# Patient Record
Sex: MALE | Race: WHITE | NOT HISPANIC OR LATINO | Employment: FULL TIME | URBAN - METROPOLITAN AREA
[De-identification: names, ages, dates, MRNs, and addresses within clinical notes are randomized per-mention and may not be internally consistent; named-entity substitution may affect disease eponyms.]

---

## 2021-06-29 ENCOUNTER — CLINICAL SUPPORT (OUTPATIENT)
Dept: FAMILY MEDICINE CLINIC | Facility: CLINIC | Age: 41
End: 2021-06-29

## 2021-06-29 DIAGNOSIS — J30.9 ALLERGIC RHINITIS, UNSPECIFIED SEASONALITY, UNSPECIFIED TRIGGER: Primary | ICD-10-CM

## 2021-06-29 PROCEDURE — 95117 IMMUNOTHERAPY INJECTIONS: CPT | Performed by: NURSE PRACTITIONER

## 2021-07-06 ENCOUNTER — CLINICAL SUPPORT (OUTPATIENT)
Dept: FAMILY MEDICINE CLINIC | Facility: CLINIC | Age: 41
End: 2021-07-06

## 2021-07-06 DIAGNOSIS — J30.9 ALLERGIC RHINITIS, UNSPECIFIED SEASONALITY, UNSPECIFIED TRIGGER: Primary | ICD-10-CM

## 2021-07-06 PROCEDURE — 95117 IMMUNOTHERAPY INJECTIONS: CPT | Performed by: NURSE PRACTITIONER

## 2021-07-13 ENCOUNTER — CLINICAL SUPPORT (OUTPATIENT)
Dept: FAMILY MEDICINE CLINIC | Facility: CLINIC | Age: 41
End: 2021-07-13

## 2021-07-13 DIAGNOSIS — J30.9 ALLERGIC RHINITIS, UNSPECIFIED SEASONALITY, UNSPECIFIED TRIGGER: Primary | ICD-10-CM

## 2021-07-13 PROCEDURE — 95117 IMMUNOTHERAPY INJECTIONS: CPT | Performed by: FAMILY MEDICINE

## 2021-07-16 ENCOUNTER — CLINICAL SUPPORT (OUTPATIENT)
Dept: FAMILY MEDICINE CLINIC | Facility: CLINIC | Age: 41
End: 2021-07-16

## 2021-07-16 DIAGNOSIS — J30.9 ALLERGIC RHINITIS, UNSPECIFIED SEASONALITY, UNSPECIFIED TRIGGER: Primary | ICD-10-CM

## 2021-07-16 PROCEDURE — 95117 IMMUNOTHERAPY INJECTIONS: CPT | Performed by: FAMILY MEDICINE

## 2021-07-21 ENCOUNTER — CLINICAL SUPPORT (OUTPATIENT)
Dept: FAMILY MEDICINE CLINIC | Facility: CLINIC | Age: 41
End: 2021-07-21

## 2021-07-21 DIAGNOSIS — J30.9 ALLERGIC RHINITIS, UNSPECIFIED SEASONALITY, UNSPECIFIED TRIGGER: Primary | ICD-10-CM

## 2021-07-21 PROCEDURE — 95117 IMMUNOTHERAPY INJECTIONS: CPT | Performed by: FAMILY MEDICINE

## 2021-07-30 ENCOUNTER — CLINICAL SUPPORT (OUTPATIENT)
Dept: FAMILY MEDICINE CLINIC | Facility: CLINIC | Age: 41
End: 2021-07-30

## 2021-07-30 DIAGNOSIS — J30.9 ALLERGIC RHINITIS, UNSPECIFIED SEASONALITY, UNSPECIFIED TRIGGER: Primary | ICD-10-CM

## 2021-07-30 PROCEDURE — 95117 IMMUNOTHERAPY INJECTIONS: CPT | Performed by: FAMILY MEDICINE

## 2021-08-03 ENCOUNTER — CLINICAL SUPPORT (OUTPATIENT)
Dept: FAMILY MEDICINE CLINIC | Facility: CLINIC | Age: 41
End: 2021-08-03

## 2021-08-03 DIAGNOSIS — J30.9 ALLERGIC RHINITIS, UNSPECIFIED SEASONALITY, UNSPECIFIED TRIGGER: Primary | ICD-10-CM

## 2021-08-03 PROCEDURE — 95117 IMMUNOTHERAPY INJECTIONS: CPT | Performed by: NURSE PRACTITIONER

## 2021-08-18 ENCOUNTER — CLINICAL SUPPORT (OUTPATIENT)
Dept: FAMILY MEDICINE CLINIC | Facility: CLINIC | Age: 41
End: 2021-08-18

## 2021-08-18 DIAGNOSIS — J30.9 ALLERGIC RHINITIS, UNSPECIFIED SEASONALITY, UNSPECIFIED TRIGGER: Primary | ICD-10-CM

## 2021-08-18 PROCEDURE — 95117 IMMUNOTHERAPY INJECTIONS: CPT | Performed by: FAMILY MEDICINE

## 2021-09-01 ENCOUNTER — CLINICAL SUPPORT (OUTPATIENT)
Dept: FAMILY MEDICINE CLINIC | Facility: CLINIC | Age: 41
End: 2021-09-01

## 2021-09-01 DIAGNOSIS — J31.0 RHINITIS, UNSPECIFIED TYPE: Primary | ICD-10-CM

## 2021-09-01 PROCEDURE — 95117 IMMUNOTHERAPY INJECTIONS: CPT | Performed by: NURSE PRACTITIONER

## 2022-11-22 ENCOUNTER — OFFICE VISIT (OUTPATIENT)
Dept: FAMILY MEDICINE CLINIC | Facility: CLINIC | Age: 42
End: 2022-11-22

## 2022-11-22 VITALS — TEMPERATURE: 99.5 F | WEIGHT: 194.4 LBS | HEART RATE: 87 BPM | OXYGEN SATURATION: 98 %

## 2022-11-22 DIAGNOSIS — J20.9 ACUTE BRONCHITIS, UNSPECIFIED ORGANISM: ICD-10-CM

## 2022-11-22 DIAGNOSIS — J01.90 ACUTE SINUSITIS, RECURRENCE NOT SPECIFIED, UNSPECIFIED LOCATION: Primary | ICD-10-CM

## 2022-11-22 PROBLEM — Q23.81 BICUSPID AORTIC VALVE: Status: ACTIVE | Noted: 2022-11-22

## 2022-11-22 PROBLEM — K58.9 IBS (IRRITABLE BOWEL SYNDROME): Status: ACTIVE | Noted: 2022-11-22

## 2022-11-22 PROBLEM — Q23.1 BICUSPID AORTIC VALVE: Status: ACTIVE | Noted: 2022-11-22

## 2022-11-22 PROBLEM — T15.00XA FOREIGN BODY OF CORNEA: Status: ACTIVE | Noted: 2022-11-22

## 2022-11-22 PROBLEM — Q63.1 HORSESHOE KIDNEY: Status: ACTIVE | Noted: 2022-11-22

## 2022-11-22 LAB
EXPIRATION DATE: NORMAL
FLUAV AG NPH QL: NEGATIVE
FLUBV AG NPH QL: NEGATIVE
INTERNAL CONTROL: NORMAL
Lab: NORMAL
SARS-COV-2 RNA PNL SPEC NAA+PROBE: NOT DETECTED

## 2022-11-22 PROCEDURE — 99213 OFFICE O/P EST LOW 20 MIN: CPT | Performed by: FAMILY MEDICINE

## 2022-11-22 PROCEDURE — 87804 INFLUENZA ASSAY W/OPTIC: CPT | Performed by: FAMILY MEDICINE

## 2022-11-22 PROCEDURE — U0004 COV-19 TEST NON-CDC HGH THRU: HCPCS | Performed by: FAMILY MEDICINE

## 2022-11-22 RX ORDER — BENZONATATE 200 MG/1
200 CAPSULE ORAL 3 TIMES DAILY PRN
Qty: 30 CAPSULE | Refills: 0 | Status: SHIPPED | OUTPATIENT
Start: 2022-11-22 | End: 2022-12-06

## 2022-11-22 RX ORDER — FLUTICASONE PROPIONATE 50 MCG
2 SPRAY, SUSPENSION (ML) NASAL DAILY
Qty: 11.1 ML | Refills: 2 | Status: SHIPPED | OUTPATIENT
Start: 2022-11-22 | End: 2022-12-06 | Stop reason: SDUPTHER

## 2022-11-22 RX ORDER — AMOXICILLIN AND CLAVULANATE POTASSIUM 500; 125 MG/1; MG/1
1 TABLET, FILM COATED ORAL 2 TIMES DAILY
Qty: 10 TABLET | Refills: 0 | Status: SHIPPED | OUTPATIENT
Start: 2022-11-22 | End: 2022-11-27

## 2022-11-22 RX ORDER — PREDNISONE 10 MG/1
30 TABLET ORAL DAILY
Qty: 15 TABLET | Refills: 0 | Status: SHIPPED | OUTPATIENT
Start: 2022-11-22 | End: 2022-11-27

## 2022-11-22 NOTE — PROGRESS NOTES
Chief Complaint    Nasal Congestion (Cough, discolored nasal drainage/congestion, sinus pressure, body aches.)    Subjective      Berry Villarreal presents to [facility]    History of Present Illness    1.) ACUTE SINUSITIS : Onset-greater than 24 hours.  The patient presents with c/o nasal/sinus congestion.  He is also c/o a cough and body aches.  No c/o fevers or chills.  No c/o loss of taste or smell.    Objective     Vital Signs:     Pulse 87   Temp 99.5 °F (37.5 °C) (Temporal)   Wt 88.2 kg (194 lb 6.4 oz)   SpO2 98%       Physical Exam  Vitals reviewed.   Constitutional:       General: He is not in acute distress.     Appearance: Normal appearance. He is well-developed.   HENT:      Head: Normocephalic and atraumatic.      Right Ear: Hearing and external ear normal. Tympanic membrane is not erythematous or bulging.      Left Ear: Hearing and external ear normal. Tympanic membrane is not erythematous or bulging.      Nose: Congestion present.      Mouth/Throat:      Pharynx: Posterior oropharyngeal erythema present. No oropharyngeal exudate.   Eyes:      General: Lids are normal.         Right eye: No discharge.         Left eye: No discharge.      Conjunctiva/sclera: Conjunctivae normal.   Pulmonary:      Effort: Pulmonary effort is normal.      Breath sounds: Normal breath sounds.   Abdominal:      General: There is no distension.   Musculoskeletal:         General: No swelling.      Cervical back: Neck supple.   Skin:     Coloration: Skin is not jaundiced.      Findings: No erythema.   Neurological:      Mental Status: He is alert. Mental status is at baseline.   Psychiatric:         Mood and Affect: Mood and affect normal.         Thought Content: Thought content normal.     Assessment and Plan     Diagnoses and all orders for this visit:    1. Acute sinusitis, recurrence not specified, unspecified location (Primary)  Comments:  Start antibiotic as directed secondary to  severity of symptoms.  Start p.o. steroid course and see if any sinus pain or worsening cough during holiday weekend  Orders:  -     POCT Influenza A/B  -     COVID-19,APTIMA PANTHER(SIOBHAN), ROSALIA/ ELVA, NP/OP SWAB IN UTM/VTM/[facility],24 HR TAT - Swab, Nasopharynx    2. Acute bronchitis, unspecified organism  Comments:  Antitussives as needed.  Rapid testing negative here in office.  Adequate fluids and rest.    Other orders  -     benzonatate (TESSALON) 200 MG capsule; Take 1 capsule by mouth 3 (Three) Times a Day As Needed for Cough.  Dispense: 30 capsule; Refill: 0  -     amoxicillin-clavulanate (Augmentin) 500-125 MG per tablet; Take 1 tablet by mouth 2 (Two) Times a Day for 5 days.  Dispense: 10 tablet; Refill: 0  -     predniSONE (DELTASONE) 10 MG tablet; Take 3 tablets by mouth Daily for 5 days.  Dispense: 15 tablet; Refill: 0  -     fluticasone (Flonase) 50 MCG/ACT nasal spray; 2 sprays into the nostril(s) as directed by provider Daily.  Dispense: 11.1 mL; Refill: 2    Follow Up : As needed.    Patient was given instructions and counseling regarding his condition or for health maintenance advice. Please see specific information pulled into the AVS if appropriate.

## 2022-12-06 ENCOUNTER — OFFICE VISIT (OUTPATIENT)
Dept: FAMILY MEDICINE CLINIC | Facility: CLINIC | Age: 42
End: 2022-12-06

## 2022-12-06 VITALS — OXYGEN SATURATION: 97 % | HEART RATE: 90 BPM | TEMPERATURE: 97.5 F | WEIGHT: 190.4 LBS

## 2022-12-06 DIAGNOSIS — H65.01 RIGHT ACUTE SEROUS OTITIS MEDIA, RECURRENCE NOT SPECIFIED: ICD-10-CM

## 2022-12-06 DIAGNOSIS — J06.9 UPPER RESPIRATORY TRACT INFECTION, UNSPECIFIED TYPE: Primary | ICD-10-CM

## 2022-12-06 PROBLEM — T15.10XA FOREIGN BODY IN CONJUNCTIVAL SAC: Status: ACTIVE | Noted: 2021-08-20

## 2022-12-06 LAB
EXPIRATION DATE: NORMAL
FLUAV AG UPPER RESP QL IA.RAPID: NOT DETECTED
FLUBV AG UPPER RESP QL IA.RAPID: NOT DETECTED
INTERNAL CONTROL: NORMAL
Lab: NORMAL
SARS-COV-2 AG UPPER RESP QL IA.RAPID: NOT DETECTED

## 2022-12-06 PROCEDURE — 99213 OFFICE O/P EST LOW 20 MIN: CPT | Performed by: FAMILY MEDICINE

## 2022-12-06 PROCEDURE — 87428 SARSCOV & INF VIR A&B AG IA: CPT | Performed by: FAMILY MEDICINE

## 2022-12-06 RX ORDER — CEFDINIR 300 MG/1
300 CAPSULE ORAL 2 TIMES DAILY
Qty: 14 CAPSULE | Refills: 0 | Status: SHIPPED | OUTPATIENT
Start: 2022-12-06 | End: 2022-12-13

## 2022-12-06 RX ORDER — PREDNISONE 10 MG/1
30 TABLET ORAL DAILY
Qty: 15 TABLET | Refills: 0 | Status: SHIPPED | OUTPATIENT
Start: 2022-12-06 | End: 2022-12-11

## 2022-12-06 RX ORDER — FLUTICASONE PROPIONATE 50 MCG
2 SPRAY, SUSPENSION (ML) NASAL DAILY
Qty: 11.1 ML | Refills: 2 | Status: SHIPPED | OUTPATIENT
Start: 2022-12-06

## 2022-12-06 NOTE — PROGRESS NOTES
Chief Complaint    Earache (RT ear pain.  Feels like full of fluid.  H/O busted eardrum.  Taking Ibuprofen) and Fever (Fever, nasal congestion continues.)    Subjective      Berry Villarreal presents to [facility]    History of Present Illness    1.) URI SXS : Onset-greater than 1 week.  Patient was recently evaluated here in office for URI symptoms.  He was treated for an acute sinus infection.  He presents today with nasal congestion.  He has experienced fevers at home.  He is also complaining of right ear pain.    Objective     Vital Signs:     Pulse 90   Temp 97.5 °F (36.4 °C) (Temporal)   Wt 86.4 kg (190 lb 6.4 oz)   SpO2 97%       Physical Exam  Vitals reviewed.   Constitutional:       General: He is not in acute distress.     Appearance: Normal appearance. He is well-developed.   HENT:      Head: Normocephalic and atraumatic.      Right Ear: Hearing and external ear normal. A middle ear effusion is present. Tympanic membrane is erythematous.      Left Ear: Hearing and external ear normal. A middle ear effusion is present. Tympanic membrane is not erythematous.      Nose: Congestion present.   Eyes:      General: Lids are normal.         Right eye: No discharge.         Left eye: No discharge.      Conjunctiva/sclera: Conjunctivae normal.   Pulmonary:      Effort: Pulmonary effort is normal.      Breath sounds: Normal breath sounds.   Abdominal:      General: There is no distension.   Musculoskeletal:         General: No swelling.      Cervical back: Neck supple.   Skin:     Coloration: Skin is not jaundiced.      Findings: No erythema.   Neurological:      Mental Status: He is alert. Mental status is at baseline.   Psychiatric:         Mood and Affect: Mood and affect normal.         Thought Content: Thought content normal.       Assessment and Plan     Diagnoses and all orders for this visit:    1. Upper respiratory tract infection, unspecified type  (Primary)  Comments:  Adequate fluids and rest.   Orders:  -     POCT SARS-CoV-2 Antigen BEE + Flu    2. Right acute serous otitis media, recurrence not specified  Comments:  Negative rapid testing. Medications as noted.     Other orders  -     cefdinir (OMNICEF) 300 MG capsule; Take 1 capsule by mouth 2 (Two) Times a Day for 7 days.  Dispense: 14 capsule; Refill: 0  -     fluticasone (Flonase) 50 MCG/ACT nasal spray; 2 sprays into the nostril(s) as directed by provider Daily.  Dispense: 11.1 mL; Refill: 2  -     predniSONE (DELTASONE) 10 MG tablet; Take 3 tablets by mouth Daily for 5 days.  Dispense: 15 tablet; Refill: 0      Follow Up : As needed.     Patient was given instructions and counseling regarding his condition or for health maintenance advice. Please see specific information pulled into the AVS if appropriate.

## 2023-01-08 ENCOUNTER — HOSPITAL ENCOUNTER (EMERGENCY)
Facility: HOSPITAL | Age: 43
Discharge: HOME | End: 2023-01-08
Attending: EMERGENCY MEDICINE | Admitting: SURGERY
Payer: COMMERCIAL

## 2023-01-08 ENCOUNTER — APPOINTMENT (OUTPATIENT)
Dept: GENERAL RADIOLOGY | Facility: HOSPITAL | Age: 43
End: 2023-01-08
Payer: COMMERCIAL

## 2023-01-08 ENCOUNTER — APPOINTMENT (OUTPATIENT)
Dept: CT IMAGING | Facility: HOSPITAL | Age: 43
End: 2023-01-08
Payer: COMMERCIAL

## 2023-01-08 ENCOUNTER — ANESTHESIA (OUTPATIENT)
Dept: PERIOP | Facility: HOSPITAL | Age: 43
End: 2023-01-08
Payer: COMMERCIAL

## 2023-01-08 ENCOUNTER — ANESTHESIA EVENT (OUTPATIENT)
Dept: PERIOP | Facility: HOSPITAL | Age: 43
End: 2023-01-08
Payer: COMMERCIAL

## 2023-01-08 VITALS
TEMPERATURE: 98.5 F | WEIGHT: 190 LBS | BODY MASS INDEX: 31.65 KG/M2 | SYSTOLIC BLOOD PRESSURE: 168 MMHG | HEIGHT: 65 IN | RESPIRATION RATE: 23 BRPM | DIASTOLIC BLOOD PRESSURE: 84 MMHG | HEART RATE: 71 BPM | OXYGEN SATURATION: 97 %

## 2023-01-08 DIAGNOSIS — K35.80 ACUTE APPENDICITIS: ICD-10-CM

## 2023-01-08 DIAGNOSIS — K35.30 ACUTE APPENDICITIS WITH LOCALIZED PERITONITIS, WITHOUT PERFORATION, ABSCESS, OR GANGRENE: ICD-10-CM

## 2023-01-08 DIAGNOSIS — R10.31 ACUTE ABDOMINAL PAIN IN RIGHT LOWER QUADRANT: Primary | ICD-10-CM

## 2023-01-08 LAB
ALBUMIN SERPL-MCNC: 4.8 G/DL (ref 3.5–5.2)
ALBUMIN/GLOB SERPL: 2.1 G/DL
ALP SERPL-CCNC: 71 U/L (ref 39–117)
ALT SERPL W P-5'-P-CCNC: 36 U/L (ref 1–41)
ANION GAP SERPL CALCULATED.3IONS-SCNC: 9.8 MMOL/L (ref 5–15)
AST SERPL-CCNC: 31 U/L (ref 1–40)
BASOPHILS # BLD AUTO: 0.02 10*3/MM3 (ref 0–0.2)
BASOPHILS NFR BLD AUTO: 0.1 % (ref 0–1.5)
BILIRUB SERPL-MCNC: 0.8 MG/DL (ref 0–1.2)
BILIRUB UR QL STRIP: NEGATIVE
BUN SERPL-MCNC: 12 MG/DL (ref 6–20)
BUN/CREAT SERPL: 16.7 (ref 7–25)
CALCIUM SPEC-SCNC: 9.5 MG/DL (ref 8.6–10.5)
CHLORIDE SERPL-SCNC: 97 MMOL/L (ref 98–107)
CLARITY UR: CLEAR
CO2 SERPL-SCNC: 28.2 MMOL/L (ref 22–29)
COLOR UR: YELLOW
CREAT SERPL-MCNC: 0.72 MG/DL (ref 0.76–1.27)
DEPRECATED RDW RBC AUTO: 40.8 FL (ref 37–54)
EGFRCR SERPLBLD CKD-EPI 2021: 117 ML/MIN/1.73
EOSINOPHIL # BLD AUTO: 0.01 10*3/MM3 (ref 0–0.4)
EOSINOPHIL NFR BLD AUTO: 0.1 % (ref 0.3–6.2)
ERYTHROCYTE [DISTWIDTH] IN BLOOD BY AUTOMATED COUNT: 12.4 % (ref 12.3–15.4)
GLOBULIN UR ELPH-MCNC: 2.3 GM/DL
GLUCOSE SERPL-MCNC: 123 MG/DL (ref 65–99)
GLUCOSE UR STRIP-MCNC: NEGATIVE MG/DL
HCT VFR BLD AUTO: 40.9 % (ref 37.5–51)
HGB BLD-MCNC: 14.2 G/DL (ref 13–17.7)
HGB UR QL STRIP.AUTO: NEGATIVE
IMM GRANULOCYTES # BLD AUTO: 0.07 10*3/MM3 (ref 0–0.05)
IMM GRANULOCYTES NFR BLD AUTO: 0.5 % (ref 0–0.5)
KETONES UR QL STRIP: ABNORMAL
LEUKOCYTE ESTERASE UR QL STRIP.AUTO: NEGATIVE
LIPASE SERPL-CCNC: 22 U/L (ref 13–60)
LYMPHOCYTES # BLD AUTO: 1.65 10*3/MM3 (ref 0.7–3.1)
LYMPHOCYTES NFR BLD AUTO: 11.5 % (ref 19.6–45.3)
MCH RBC QN AUTO: 30.9 PG (ref 26.6–33)
MCHC RBC AUTO-ENTMCNC: 34.7 G/DL (ref 31.5–35.7)
MCV RBC AUTO: 88.9 FL (ref 79–97)
MONOCYTES # BLD AUTO: 0.56 10*3/MM3 (ref 0.1–0.9)
MONOCYTES NFR BLD AUTO: 3.9 % (ref 5–12)
NEUTROPHILS NFR BLD AUTO: 12.03 10*3/MM3 (ref 1.7–7)
NEUTROPHILS NFR BLD AUTO: 83.9 % (ref 42.7–76)
NITRITE UR QL STRIP: NEGATIVE
NRBC BLD AUTO-RTO: 0 /100 WBC (ref 0–0.2)
PH UR STRIP.AUTO: 6.5 [PH] (ref 5–8)
PLATELET # BLD AUTO: 195 10*3/MM3 (ref 140–450)
PMV BLD AUTO: 10.5 FL (ref 6–12)
POTASSIUM SERPL-SCNC: 4.1 MMOL/L (ref 3.5–5.2)
PROT SERPL-MCNC: 7.1 G/DL (ref 6–8.5)
PROT UR QL STRIP: ABNORMAL
QT INTERVAL: 446 MS
RBC # BLD AUTO: 4.6 10*6/MM3 (ref 4.14–5.8)
SODIUM SERPL-SCNC: 135 MMOL/L (ref 136–145)
SP GR UR STRIP: 1.02 (ref 1–1.03)
TROPONIN T SERPL-MCNC: <0.01 NG/ML (ref 0–0.03)
UROBILINOGEN UR QL STRIP: ABNORMAL
WBC NRBC COR # BLD: 14.34 10*3/MM3 (ref 3.4–10.8)

## 2023-01-08 PROCEDURE — 99285 EMERGENCY DEPT VISIT HI MDM: CPT

## 2023-01-08 PROCEDURE — 88304 TISSUE EXAM BY PATHOLOGIST: CPT | Performed by: SURGERY

## 2023-01-08 PROCEDURE — 85025 COMPLETE CBC W/AUTO DIFF WBC: CPT | Performed by: PHYSICIAN ASSISTANT

## 2023-01-08 PROCEDURE — 25010000002 PIPERACILLIN SOD-TAZOBACTAM PER 1 G: Performed by: EMERGENCY MEDICINE

## 2023-01-08 PROCEDURE — 96365 THER/PROPH/DIAG IV INF INIT: CPT

## 2023-01-08 PROCEDURE — 96375 TX/PRO/DX INJ NEW DRUG ADDON: CPT

## 2023-01-08 PROCEDURE — 99284 EMERGENCY DEPT VISIT MOD MDM: CPT

## 2023-01-08 PROCEDURE — 25010000002 PROPOFOL 10 MG/ML EMULSION: Performed by: ANESTHESIOLOGY

## 2023-01-08 PROCEDURE — 44970 LAPAROSCOPY APPENDECTOMY: CPT | Performed by: SURGERY

## 2023-01-08 PROCEDURE — 71045 X-RAY EXAM CHEST 1 VIEW: CPT

## 2023-01-08 PROCEDURE — 81003 URINALYSIS AUTO W/O SCOPE: CPT | Performed by: PHYSICIAN ASSISTANT

## 2023-01-08 PROCEDURE — 25010000002 FENTANYL CITRATE (PF) 50 MCG/ML SOLUTION: Performed by: ANESTHESIOLOGY

## 2023-01-08 PROCEDURE — 74176 CT ABD & PELVIS W/O CONTRAST: CPT

## 2023-01-08 PROCEDURE — 25010000002 MORPHINE PER 10 MG: Performed by: EMERGENCY MEDICINE

## 2023-01-08 PROCEDURE — 93005 ELECTROCARDIOGRAM TRACING: CPT | Performed by: EMERGENCY MEDICINE

## 2023-01-08 PROCEDURE — 83690 ASSAY OF LIPASE: CPT | Performed by: PHYSICIAN ASSISTANT

## 2023-01-08 PROCEDURE — 99223 1ST HOSP IP/OBS HIGH 75: CPT | Performed by: SURGERY

## 2023-01-08 PROCEDURE — 25010000002 NEOSTIGMINE 5 MG/10ML SOLUTION: Performed by: ANESTHESIOLOGY

## 2023-01-08 PROCEDURE — 25010000002 ONDANSETRON PER 1 MG: Performed by: ANESTHESIOLOGY

## 2023-01-08 PROCEDURE — 93005 ELECTROCARDIOGRAM TRACING: CPT

## 2023-01-08 PROCEDURE — 80053 COMPREHEN METABOLIC PANEL: CPT | Performed by: PHYSICIAN ASSISTANT

## 2023-01-08 PROCEDURE — 84484 ASSAY OF TROPONIN QUANT: CPT | Performed by: PHYSICIAN ASSISTANT

## 2023-01-08 PROCEDURE — 25010000002 ONDANSETRON PER 1 MG: Performed by: EMERGENCY MEDICINE

## 2023-01-08 PROCEDURE — 93010 ELECTROCARDIOGRAM REPORT: CPT | Performed by: INTERNAL MEDICINE

## 2023-01-08 DEVICE — THE ECHELON, ECHELON ENDOPATH™ AND ECHELON FLEX™ FAMILIES OF ENDOSCOPIC LINEAR CUTTERS AND RELOADS ARE STERILE, SINGLE PATIENT USE INSTRUMENTS THAT SIMULTANEOUSLY CUT AND STAPLE TISSUE. THERE ARE SIX STAGGERED ROWS OF STAPLES, THREE ON EITHER SIDE OF THE CUT LINE. THE 45 MM INSTRUMENTS HAVE A STAPLE LINE THATIS APPROXIMATELY 45 MM LONG AND A CUT LINE THAT IS APPROXIMATELY 42 MM LONG. THE SHAFT CAN ROTATE FREELY IN BOTH DIRECTIONS AND AN ARTICULATION MECHANISM ON ARTICULATING INSTRUMENTS ENABLES BENDING THE DISTAL PORTIONOF THE SHAFT TO FACILITATE LATERAL ACCESS OF THE OPERATIVE SITE.THE INSTRUMENTS ARE SHIPPED WITHOUT A RELOAD AND MUST BE LOADED PRIOR TO USE. A STAPLE RETAINING CAP ON THE RELOAD PROTECTS THE STAPLE LEG POINTS DURING SHIPPING AND TRANSPORTATION. THE INSTRUMENTS’ LOCK-OUT FEATURE IS DESIGNED TO PREVENT A USED RELOAD FROM BEING REFIRED.
Type: IMPLANTABLE DEVICE | Site: ABDOMEN | Status: FUNCTIONAL
Brand: ECHELON ENDOPATH

## 2023-01-08 RX ORDER — OXYCODONE AND ACETAMINOPHEN 7.5; 325 MG/1; MG/1
1 TABLET ORAL EVERY 4 HOURS PRN
Status: DISCONTINUED | OUTPATIENT
Start: 2023-01-08 | End: 2023-01-08 | Stop reason: HOSPADM

## 2023-01-08 RX ORDER — ONDANSETRON 4 MG/1
4 TABLET, FILM COATED ORAL EVERY 8 HOURS PRN
Qty: 30 TABLET | Refills: 1 | Status: SHIPPED | OUTPATIENT
Start: 2023-01-08 | End: 2023-01-20

## 2023-01-08 RX ORDER — SODIUM CHLORIDE 0.9 % (FLUSH) 0.9 %
10 SYRINGE (ML) INJECTION EVERY 12 HOURS SCHEDULED
Status: DISCONTINUED | OUTPATIENT
Start: 2023-01-08 | End: 2023-01-08 | Stop reason: HOSPADM

## 2023-01-08 RX ORDER — ROCURONIUM BROMIDE 10 MG/ML
INJECTION, SOLUTION INTRAVENOUS AS NEEDED
Status: DISCONTINUED | OUTPATIENT
Start: 2023-01-08 | End: 2023-01-08 | Stop reason: SURG

## 2023-01-08 RX ORDER — PROPOFOL 10 MG/ML
VIAL (ML) INTRAVENOUS AS NEEDED
Status: DISCONTINUED | OUTPATIENT
Start: 2023-01-08 | End: 2023-01-08 | Stop reason: SURG

## 2023-01-08 RX ORDER — FLUMAZENIL 0.1 MG/ML
0.2 INJECTION INTRAVENOUS AS NEEDED
Status: DISCONTINUED | OUTPATIENT
Start: 2023-01-08 | End: 2023-01-08 | Stop reason: HOSPADM

## 2023-01-08 RX ORDER — NALOXONE HCL 0.4 MG/ML
0.2 VIAL (ML) INJECTION AS NEEDED
Status: DISCONTINUED | OUTPATIENT
Start: 2023-01-08 | End: 2023-01-08 | Stop reason: HOSPADM

## 2023-01-08 RX ORDER — ONDANSETRON 2 MG/ML
4 INJECTION INTRAMUSCULAR; INTRAVENOUS ONCE AS NEEDED
Status: DISCONTINUED | OUTPATIENT
Start: 2023-01-08 | End: 2023-01-08 | Stop reason: HOSPADM

## 2023-01-08 RX ORDER — OXYCODONE HYDROCHLORIDE AND ACETAMINOPHEN 5; 325 MG/1; MG/1
1 TABLET ORAL ONCE AS NEEDED
Status: DISCONTINUED | OUTPATIENT
Start: 2023-01-08 | End: 2023-01-08 | Stop reason: HOSPADM

## 2023-01-08 RX ORDER — PROMETHAZINE HYDROCHLORIDE 25 MG/1
25 SUPPOSITORY RECTAL ONCE AS NEEDED
Status: DISCONTINUED | OUTPATIENT
Start: 2023-01-08 | End: 2023-01-08 | Stop reason: HOSPADM

## 2023-01-08 RX ORDER — DIPHENHYDRAMINE HCL 25 MG
25 CAPSULE ORAL
Status: DISCONTINUED | OUTPATIENT
Start: 2023-01-08 | End: 2023-01-08 | Stop reason: HOSPADM

## 2023-01-08 RX ORDER — PROMETHAZINE HYDROCHLORIDE 25 MG/1
12.5 TABLET ORAL ONCE AS NEEDED
Status: DISCONTINUED | OUTPATIENT
Start: 2023-01-08 | End: 2023-01-08 | Stop reason: HOSPADM

## 2023-01-08 RX ORDER — LIDOCAINE HYDROCHLORIDE 20 MG/ML
INJECTION, SOLUTION INFILTRATION; PERINEURAL AS NEEDED
Status: DISCONTINUED | OUTPATIENT
Start: 2023-01-08 | End: 2023-01-08 | Stop reason: SURG

## 2023-01-08 RX ORDER — SODIUM CHLORIDE 9 MG/ML
40 INJECTION, SOLUTION INTRAVENOUS AS NEEDED
Status: DISCONTINUED | OUTPATIENT
Start: 2023-01-08 | End: 2023-01-08 | Stop reason: HOSPADM

## 2023-01-08 RX ORDER — FAMOTIDINE 10 MG/ML
20 INJECTION, SOLUTION INTRAVENOUS
Status: DISCONTINUED | OUTPATIENT
Start: 2023-01-08 | End: 2023-01-08 | Stop reason: HOSPADM

## 2023-01-08 RX ORDER — GLYCOPYRROLATE 0.2 MG/ML
INJECTION INTRAMUSCULAR; INTRAVENOUS AS NEEDED
Status: DISCONTINUED | OUTPATIENT
Start: 2023-01-08 | End: 2023-01-08 | Stop reason: SURG

## 2023-01-08 RX ORDER — DIPHENHYDRAMINE HYDROCHLORIDE 50 MG/ML
12.5 INJECTION INTRAMUSCULAR; INTRAVENOUS
Status: DISCONTINUED | OUTPATIENT
Start: 2023-01-08 | End: 2023-01-08 | Stop reason: HOSPADM

## 2023-01-08 RX ORDER — AMOXICILLIN AND CLAVULANATE POTASSIUM 875; 125 MG/1; MG/1
1 TABLET, FILM COATED ORAL 2 TIMES DAILY
Qty: 10 TABLET | Refills: 0 | Status: SHIPPED | OUTPATIENT
Start: 2023-01-08 | End: 2023-01-13

## 2023-01-08 RX ORDER — BUPIVACAINE HYDROCHLORIDE AND EPINEPHRINE 5; 5 MG/ML; UG/ML
INJECTION, SOLUTION EPIDURAL; INTRACAUDAL; PERINEURAL AS NEEDED
Status: DISCONTINUED | OUTPATIENT
Start: 2023-01-08 | End: 2023-01-08 | Stop reason: HOSPADM

## 2023-01-08 RX ORDER — FENTANYL CITRATE 50 UG/ML
50 INJECTION, SOLUTION INTRAMUSCULAR; INTRAVENOUS
Status: DISCONTINUED | OUTPATIENT
Start: 2023-01-08 | End: 2023-01-08 | Stop reason: HOSPADM

## 2023-01-08 RX ORDER — MORPHINE SULFATE 2 MG/ML
4 INJECTION, SOLUTION INTRAMUSCULAR; INTRAVENOUS ONCE
Status: COMPLETED | OUTPATIENT
Start: 2023-01-08 | End: 2023-01-08

## 2023-01-08 RX ORDER — HYDRALAZINE HYDROCHLORIDE 20 MG/ML
5 INJECTION INTRAMUSCULAR; INTRAVENOUS
Status: DISCONTINUED | OUTPATIENT
Start: 2023-01-08 | End: 2023-01-08 | Stop reason: HOSPADM

## 2023-01-08 RX ORDER — AMOXICILLIN 250 MG
2 CAPSULE ORAL DAILY PRN
Qty: 30 TABLET | Refills: 1 | Status: SHIPPED | OUTPATIENT
Start: 2023-01-08 | End: 2023-01-20

## 2023-01-08 RX ORDER — HYDROMORPHONE HYDROCHLORIDE 1 MG/ML
0.5 INJECTION, SOLUTION INTRAMUSCULAR; INTRAVENOUS; SUBCUTANEOUS
Status: DISCONTINUED | OUTPATIENT
Start: 2023-01-08 | End: 2023-01-08 | Stop reason: HOSPADM

## 2023-01-08 RX ORDER — NEOSTIGMINE METHYLSULFATE 0.5 MG/ML
INJECTION, SOLUTION INTRAVENOUS AS NEEDED
Status: DISCONTINUED | OUTPATIENT
Start: 2023-01-08 | End: 2023-01-08 | Stop reason: SURG

## 2023-01-08 RX ORDER — EPHEDRINE SULFATE 50 MG/ML
5 INJECTION, SOLUTION INTRAVENOUS ONCE AS NEEDED
Status: DISCONTINUED | OUTPATIENT
Start: 2023-01-08 | End: 2023-01-08 | Stop reason: HOSPADM

## 2023-01-08 RX ORDER — LABETALOL HYDROCHLORIDE 5 MG/ML
5 INJECTION, SOLUTION INTRAVENOUS
Status: DISCONTINUED | OUTPATIENT
Start: 2023-01-08 | End: 2023-01-08 | Stop reason: HOSPADM

## 2023-01-08 RX ORDER — SODIUM CHLORIDE 0.9 % (FLUSH) 0.9 %
10 SYRINGE (ML) INJECTION AS NEEDED
Status: DISCONTINUED | OUTPATIENT
Start: 2023-01-08 | End: 2023-01-08 | Stop reason: HOSPADM

## 2023-01-08 RX ORDER — SODIUM CHLORIDE, SODIUM LACTATE, POTASSIUM CHLORIDE, CALCIUM CHLORIDE 600; 310; 30; 20 MG/100ML; MG/100ML; MG/100ML; MG/100ML
9 INJECTION, SOLUTION INTRAVENOUS CONTINUOUS PRN
Status: DISCONTINUED | OUTPATIENT
Start: 2023-01-08 | End: 2023-01-08 | Stop reason: HOSPADM

## 2023-01-08 RX ORDER — MIDAZOLAM HYDROCHLORIDE 1 MG/ML
1 INJECTION INTRAMUSCULAR; INTRAVENOUS
Status: DISCONTINUED | OUTPATIENT
Start: 2023-01-08 | End: 2023-01-08 | Stop reason: HOSPADM

## 2023-01-08 RX ORDER — ONDANSETRON 2 MG/ML
4 INJECTION INTRAMUSCULAR; INTRAVENOUS ONCE
Status: COMPLETED | OUTPATIENT
Start: 2023-01-08 | End: 2023-01-08

## 2023-01-08 RX ORDER — DOCUSATE SODIUM 100 MG/1
100 CAPSULE, LIQUID FILLED ORAL 2 TIMES DAILY PRN
Status: DISCONTINUED | OUTPATIENT
Start: 2023-01-08 | End: 2023-01-08 | Stop reason: HOSPADM

## 2023-01-08 RX ORDER — CEFAZOLIN SODIUM 2 G/100ML
2 INJECTION, SOLUTION INTRAVENOUS
Status: DISCONTINUED | OUTPATIENT
Start: 2023-01-08 | End: 2023-01-08 | Stop reason: HOSPADM

## 2023-01-08 RX ORDER — OXYCODONE HYDROCHLORIDE AND ACETAMINOPHEN 5; 325 MG/1; MG/1
1 TABLET ORAL EVERY 6 HOURS PRN
Qty: 20 TABLET | Refills: 0 | Status: SHIPPED | OUTPATIENT
Start: 2023-01-08 | End: 2023-01-20

## 2023-01-08 RX ORDER — MAGNESIUM HYDROXIDE 1200 MG/15ML
LIQUID ORAL AS NEEDED
Status: DISCONTINUED | OUTPATIENT
Start: 2023-01-08 | End: 2023-01-08 | Stop reason: HOSPADM

## 2023-01-08 RX ORDER — FENTANYL CITRATE 50 UG/ML
INJECTION, SOLUTION INTRAMUSCULAR; INTRAVENOUS AS NEEDED
Status: DISCONTINUED | OUTPATIENT
Start: 2023-01-08 | End: 2023-01-08 | Stop reason: SURG

## 2023-01-08 RX ORDER — ONDANSETRON 2 MG/ML
INJECTION INTRAMUSCULAR; INTRAVENOUS AS NEEDED
Status: DISCONTINUED | OUTPATIENT
Start: 2023-01-08 | End: 2023-01-08 | Stop reason: SURG

## 2023-01-08 RX ORDER — PROMETHAZINE HYDROCHLORIDE 25 MG/1
25 TABLET ORAL ONCE AS NEEDED
Status: DISCONTINUED | OUTPATIENT
Start: 2023-01-08 | End: 2023-01-08 | Stop reason: HOSPADM

## 2023-01-08 RX ORDER — HYDROCODONE BITARTRATE AND ACETAMINOPHEN 7.5; 325 MG/1; MG/1
1 TABLET ORAL ONCE AS NEEDED
Status: DISCONTINUED | OUTPATIENT
Start: 2023-01-08 | End: 2023-01-08 | Stop reason: HOSPADM

## 2023-01-08 RX ADMIN — TAZOBACTAM SODIUM AND PIPERACILLIN SODIUM 3.38 G: 375; 3 INJECTION, SOLUTION INTRAVENOUS at 14:34

## 2023-01-08 RX ADMIN — FENTANYL CITRATE 100 MCG: 50 INJECTION, SOLUTION INTRAMUSCULAR; INTRAVENOUS at 17:28

## 2023-01-08 RX ADMIN — ONDANSETRON 4 MG: 2 INJECTION INTRAMUSCULAR; INTRAVENOUS at 13:13

## 2023-01-08 RX ADMIN — ONDANSETRON 4 MG: 2 INJECTION INTRAMUSCULAR; INTRAVENOUS at 18:09

## 2023-01-08 RX ADMIN — GLYCOPYRROLATE 0.6 MCG: 1 INJECTION INTRAMUSCULAR; INTRAVENOUS at 18:15

## 2023-01-08 RX ADMIN — MORPHINE SULFATE 4 MG: 2 INJECTION, SOLUTION INTRAMUSCULAR; INTRAVENOUS at 13:13

## 2023-01-08 RX ADMIN — OXYCODONE AND ACETAMINOPHEN 1 TABLET: 7.5; 325 TABLET ORAL at 19:05

## 2023-01-08 RX ADMIN — NEOSTIGMINE METHYLSULFATE 3 MG: 0.5 INJECTION INTRAVENOUS at 18:15

## 2023-01-08 RX ADMIN — LIDOCAINE HYDROCHLORIDE 100 MG: 20 INJECTION, SOLUTION INFILTRATION; PERINEURAL at 17:28

## 2023-01-08 RX ADMIN — ROCURONIUM BROMIDE 50 MG: 10 INJECTION, SOLUTION INTRAVENOUS at 17:28

## 2023-01-08 RX ADMIN — PROPOFOL 150 MG: 10 INJECTION, EMULSION INTRAVENOUS at 17:28

## 2023-01-08 RX ADMIN — SODIUM CHLORIDE, POTASSIUM CHLORIDE, SODIUM LACTATE AND CALCIUM CHLORIDE: 600; 310; 30; 20 INJECTION, SOLUTION INTRAVENOUS at 17:26

## 2023-01-08 NOTE — ED TRIAGE NOTES
Chest tightness.  Pt started c epigastric pain.  Pain moved to left lower side and now is right lower side.  He says his chest is on fire.  He has been vomitng    Patient was placed in face mask during first look triage.  Patient was wearing a face mask throughout encounter.  I wore personal protective equipment throughout the encounter.  Hand hygiene was performed before and after patient encounter.

## 2023-01-08 NOTE — ANESTHESIA PREPROCEDURE EVALUATION
Anesthesia Evaluation     Patient summary reviewed   NPO Solid Status: > 8 hours             Airway   Mallampati: II  Neck ROM: full  No difficulty expected  Dental      Pulmonary     breath sounds clear to auscultation  Cardiovascular     ECG reviewed  Rhythm: regular    (+) valvular problems/murmurs (bicuspid AV),       Neuro/Psych  GI/Hepatic/Renal/Endo    (+)   renal disease,     Musculoskeletal     Abdominal    Substance History      OB/GYN          Other                        Anesthesia Plan    ASA 2 - emergent       Anesthetic plan, risks, benefits, and alternatives have been provided, discussed and informed consent has been obtained with: patient.    Use of blood products discussed with patient .       CODE STATUS:

## 2023-01-08 NOTE — ED NOTES
"Patient presents to the ED with complaint of  Abdominal pain with nausea and vomiting  for the following  1 day. Patient is alert, oriented x3  speech normal in context and clarity  motor strength: full proximally and distally. Patient denies fever. Nursing Assessment completed at this time.    /79 (BP Location: Right arm, Patient Position: Sitting)   Pulse 75   Temp 97.3 °F (36.3 °C) (Tympanic)   Resp 16   Ht 165.1 cm (65\")   Wt 86.2 kg (190 lb)   SpO2 99%   BMI 31.62 kg/m²   General appearance: alert, appears stated age, and cooperative  Abdomen: normal findings: bowel sounds normal, no masses palpable, no organomegaly, symmetric, and soft and abnormal findings:  moderate tenderness in the RLQ and guarding present       GI/ Abd pain with N/V this am. Began in the epigastrum, now with pain in RLQ, tenderness present. No fever per patient.     No distress noted. Will monitor for changes. VS below    I have reviewed the patient's current vital signs as documented in the patient's EMR.    "

## 2023-01-08 NOTE — OP NOTE
PREOPERATIVE DIAGNOSIS:  Acute Appendicitis  POSTOPERATIVE DIAGNOSIS:  Acute suppurative appendicitis   PROCEDURE:  Laparoscopic Appendectomy  SURGEON/STAFF:  GAB Barrientos  ANESTHESIA:  General.   BLOOD LOSS: Minimal  COUNTS:  Needle and sponge count correct.  SPECIMENS:  Appendix    Findings: Acute appendicitis with appendicolith at the mid aspect of the appendix    INDICATIONS FOR OPERATION:  Berry Villarreal is a 42 y.o. year old male who presented to to the ED for evaluation of abdominal pain. He was found to have acute appendicitis on imaging.  He was examined and she was found to have findings consistent with acute appendicitis.  I discussed with the patient about treatment options that would include conservative management with antibiotics versus laparoscopic appendectomy.  Risk and benefits of both approaches were discussed in detail with the patient including the possibility of 50% failure of antibiotic only treatment versus the risk of bleeding, infection, abscess formation, intra-abdominal injuries, stump leak and the risk of anesthesia. After considering risk and benefits the patient has decided to proceed with laparoscopic appendectomy.    OPERATION:  The patient was brought to the operating room in stable condition.   Preoperative antibiotics were given and sequential compression devices were applied.  At this time, the patient was laid supine on the operating room table.  General anesthesia was induced by the Anesthesia service without difficulty. A enriquez catheter was placed by the nursing staff.  The patient's abdomen was prepped and draped in the usual sterile fashion.      At this time, the patient's abdomen was accessed at the supraumbilical area with Optiview technique and insertion of a 5 mm trocar, pneumoperitoneum was insufflated.  Upon exploration of the abdominal cavity there was no evidence of injury from the procedure.  Another 5 mm trocar was placed in the suprapubic area and another 5 mm  trochars in the left lower quadrant.  This was done under direct camera vision.  Initial 5 mm trocar was switched by an 11 mm trocar.  There was a single adhesion from the omentum to the anterior abdominal wall at the level of the umbilicus.  The singular lesion was transected with harmonic.  The appendix was then visualized and he was found to be inflamed.  The base of the appendix was not inflamed.  Retroperitoneal window was created and with Catlin white load stapler the appendiceal base was transected.  The mesentery of the appendix was then transected with harmonic.  There was no evidence of bleeding.  The specimen was placed in an Endo Catch bag and retrieved from the patient abdomen.  Exploration of the abdominal cavity reveals no evidence of injury from the procedure neither any other intra-abdominal pathology.  The appendix was sent for pathology analysis.  The 11 mm trocar was removed and the defect was closed with 0 Vicryl and Endo Close technique.  The left lower quadrant 5 mm trocar was removed and the defect was closed with 0 Vicryl and Endo Close technique.  Pneumoperitoneum was evacuated and the last 5 mm trocar was removed.  All incisions were closed with running 4-0 Monocryl and surgical glue.  The patient tolerated the procedure well he was sent to recovery area in stable condition.  Instrument sponge count were correct    Efren Barrientos MD  General, Minimally Invasive and Endoscopic Surgery  [facility]    [address], [address]  [place], [place], [address]  P: [phone]  F: [phone]

## 2023-01-08 NOTE — H&P
Admission H&P    Admiting Physician: Efren Barrientos MD    Reason for admission: Acute appendicitis.    Chief Complaint   Patient presents with   • Chest Pain   • Abdominal Pain       HPI:   The patient is a very pleasant 42 y.o. years old male that presented to the hospital with abdominal pain.  The pain started this morning.  The pain has been getting worse throughout the day.  The pain was initially located over the epigastric area.  The pain radiated to the right lower quadrant.  The pain was associated with nausea and vomiting.  He denies any fevers or chills.  Reports no similar episode of pain like this in the past.  Reports having irritable bowel syndrome.  Denies any recent change of bowel habits.  No constipation.  He presented to the emergency room for further evaluation.  He was found to have acute appendicitis on CT scan.    History reviewed. No pertinent past medical history.    Past Surgical History:   Procedure Laterality Date   • HERNIA REPAIR           Current Facility-Administered Medications:   •  docusate sodium (COLACE) capsule 100 mg, 100 mg, Oral, BID PRN, Efren Barrientos MD  •  famotidine (PEPCID) injection 20 mg, 20 mg, Intravenous, 60 Min Pre-Op, Sacha Moraes MD  •  lactated ringers infusion, 9 mL/hr, Intravenous, Continuous PRN, Sacha Moraes MD  •  midazolam (VERSED) injection 1 mg, 1 mg, Intravenous, Q10 Min PRN, Sacha Moraes MD  •  promethazine (PHENERGAN) tablet 12.5 mg, 12.5 mg, Oral, Once PRN, Efren Barrientos MD  •  sodium chloride 0.9 % flush 10 mL, 10 mL, Intravenous, Q12H, Sacha Moraes MD  •  sodium chloride 0.9 % flush 10 mL, 10 mL, Intravenous, PRN, Sacha Moraes MD  •  sodium chloride 0.9 % infusion 40 mL, 40 mL, Intravenous, PRN, Sacha Moraes MD    Allergies   Allergen Reactions   • Iodine Hives       Social History     Socioeconomic History   • Marital status:    Tobacco Use   • Smoking status: Never   • Smokeless  tobacco: Never   Vaping Use   • Vaping Use: Never used   Substance and Sexual Activity   • Alcohol use: Not Currently   • Drug use: Never   • Sexual activity: Yes     Partners: Female       Family History   Problem Relation Age of Onset   • COPD Mother    • No Known Problems Father        ROS:   Constitutional: denies any weight changes, fatigue or weakness.  Eyes: : denies blurred or double vision  Cardiovascular: denies chest pain, palpitations, edemas.  Respiratory: denies cough, sputum, SOB.  Gastrointestinal: Reports abdominal pain, nausea vomiting.  Genitourinary: denies dysuria, frequency.  Endocrine: denies cold intolerance, lethargy and flushing.  Hem: denies excessive bruising and postop bleeding.  Musculoskeletal: denies weakness, joint swelling, pain or stiffness.  Neuro: denies seizures, CVA, paresthesia, or peripheral neuropathy.   Skin: denies change in nevi, rashes, masses.    Physical Exam:   Vitals:    01/08/23 1518   BP: 126/74   Pulse: 68   Resp:    Temp: 98.2 °F (36.8 °C)   SpO2: 100%     GENERAL:alert, well appearing, and in no distress and oriented to person, place, and time  HEENT: normochephalic, atraumatic, no scleral icterus moist mucous membranes.  NECK: Supple there is no thyromegaly or lymphadenopathy  CHEST: clear to auscultation, no wheezes, rales or rhonchi, symmetric air entry  CARDIAC: regular rate and rhythm    ABDOMEN: soft, tender to palpation in the right lower quadrant, positive localized peritoneal irritation., nondistended, no masses or organomegaly, well-healed left inguinal incision and infraumbilical incision,  EXTREMITIES: no cyanosis, clubbing or edema    NEURO: alert and oriented, normal speech, cranial nerves 2-12 grossly intact, no focal deficits   SKIN: Moist, warm, no rashes.    Diagnostic workup:   CT abdomen and pelvis that was done today show evidence of acute appendicitis with mostly thickening distal appendix with appendicolith    WBC 14.3  Hgb 14.2  Na  135  Cl 97  Cr 0.72    Assessment and plan:   The patient is a very pleasant 42 y.o. years old male with clinical as well as imaging findings of acute appendicitis.  I discussed with the patient about further step and option for treatment that would include conservative management with antibiotic treatment versus laparoscopic appendectomy.  Risk and benefits of both approaches including approximately 50% failure with antibiotic therapy alone and the risk of bleeding, infections, intra-abdominal injuries, intra-abdominal abscess and stump leaks for laparoscopic appendectomy.  Patient has elected to undergo laparoscopic appendectomy.    Plan:    - Laparoscopic appendectomy, possible open.   - NPO  - IVF  - IV antibiotics  - Consent for laparoscopic appendectomy possible open    Case was discussed with patient and wife.    Risk and benefits of the current recommended treatment were explained to the patient that had time to ask questions that where answered, verbalized understanding and agreed with the plan.     Efren Barrientos MD  General, Minimally Invasive and Endoscopic Surgery  [facility]    [address], [address]  [place], [place], [address]  P: [phone]  F: [phone]

## 2023-01-08 NOTE — ANESTHESIA PROCEDURE NOTES
Airway  Urgency: elective    Date/Time: 1/8/2023 5:33 PM    General Information and Staff    Patient location during procedure: OR  Anesthesiologist: Maricruz Marr MD    Indications and Patient Condition  Indications for airway management: airway protection    Preoxygenated: yes  Mask difficulty assessment: 1 - vent by mask    Final Airway Details  Final airway type: endotracheal airway      Successful airway: ETT  Cuffed: yes   Successful intubation technique: direct laryngoscopy  Facilitating devices/methods: Bougie  Endotracheal tube insertion site: oral  Blade: Bridget  Blade size: 3  ETT size (mm): 7.5  Cormack-Lehane Classification: grade IIa - partial view of glottis  Placement verified by: chest auscultation and capnometry   Measured from: gums  Number of attempts at approach: 1  Assessment: lips, teeth, and gum same as pre-op and atraumatic intubation

## 2023-01-08 NOTE — ED PROVIDER NOTES
EMERGENCY DEPARTMENT ENCOUNTER    Room Number:  28/28  Date of encounter:  1/8/2023  PCP: Carla Diamond DO  Patient Care Team:  Carla Diamond DO as PCP - General (Family Medicine)  Sukhwinder Gibson MD as Consulting Physician (Family Medicine)   Independent Historians: Patient, family    HPI:  Chief Complaint: Abdominal pain  A complete HPI/ROS/PMH/PSH/SH/FH are unobtainable due to: Nothing    Chronic or social conditions impacting patient care (social determinants of health): None    Context: Berry Villarreal is a 42 y.o. male who presents to the ED c/o abdominal pain that started around 530 this morning.  He reports that it started hurting in his epigastrium.  It is since moved down to his right lower quadrant.  He has developed nausea and vomiting.  He denies any fevers or chills.  He reports a history of IBS but this feels different.  He states he still has his appendix and gallbladder.  Anytime she tries to make anything he gets nauseated and vomits.  He states the last time he had anything to eat was at 7 PM last night.  He states sometimes the pain in his abdomen feels cold and sometimes it feels very hot.  Currently his pain is in his right lower quadrant and does not radiate.    Review of prior external notes (non-ED): Primary care note dated 12/6/2022 for right earache    Review of prior external test results outside of this encounter: SARS antigen dated 12/6/2022 was normal.  Flu a and B was also normal at that time.    PAST MEDICAL HISTORY  Active Ambulatory Problems     Diagnosis Date Noted   • Bicuspid aortic valve 11/22/2022   • Foreign body of cornea 11/22/2022   • Horseshoe kidney 11/22/2022   • IBS (irritable bowel syndrome) 11/22/2022   • Foreign body in conjunctival sac 08/20/2021     Resolved Ambulatory Problems     Diagnosis Date Noted   • No Resolved Ambulatory Problems     No Additional Past Medical History       The patient qualifies to receive the vaccine, but they have not yet received  it.    PAST SURGICAL HISTORY  History reviewed. No pertinent surgical history.      FAMILY HISTORY  Family History   Problem Relation Age of Onset   • COPD Mother    • No Known Problems Father          SOCIAL HISTORY  Social History     Socioeconomic History   • Marital status:    Tobacco Use   • Smoking status: Never   • Smokeless tobacco: Never   Vaping Use   • Vaping Use: Never used   Substance and Sexual Activity   • Alcohol use: Not Currently   • Drug use: Never   • Sexual activity: Yes     Partners: Female         ALLERGIES  Iodine        REVIEW OF SYSTEMS  Review of Systems   No chest pain, no shortness of breath, positive nausea, positive vomiting, no fever, no chills, no headache, no diarrhea, positive constipation  All systems reviewed and negative except for those discussed in HPI.       PHYSICAL EXAM    I have reviewed the triage vital signs and nursing notes.    ED Triage Vitals [01/08/23 1157]   Temp Heart Rate Resp BP SpO2   97.3 °F (36.3 °C) 75 16 -- 99 %      Temp src Heart Rate Source Patient Position BP Location FiO2 (%)   Tympanic Monitor -- -- --       Physical Exam  GENERAL: Awake, alert, no acute distress  SKIN: Warm, dry  HENT: Normocephalic, atraumatic  EYES: no scleral icterus  CV: regular rhythm, regular rate  RESPIRATORY: normal effort, lungs clear  ABDOMEN: soft, moderate tenderness in the right lower quadrant, nondistended  MUSCULOSKELETAL: no deformity  NEURO: alert, moves all extremities, follows commands          LAB RESULTS  Recent Results (from the past 24 hour(s))   ECG 12 Lead Chest Pain    Collection Time: 01/08/23 12:03 PM   Result Value Ref Range    QT Interval 446 ms   Comprehensive Metabolic Panel    Collection Time: 01/08/23 12:37 PM    Specimen: Blood   Result Value Ref Range    Glucose 123 (H) 65 - 99 mg/dL    BUN 12 6 - 20 mg/dL    Creatinine 0.72 (L) 0.76 - 1.27 mg/dL    Sodium 135 (L) 136 - 145 mmol/L    Potassium 4.1 3.5 - 5.2 mmol/L    Chloride 97 (L) 98 - 107  mmol/L    CO2 28.2 22.0 - 29.0 mmol/L    Calcium 9.5 8.6 - 10.5 mg/dL    Total Protein 7.1 6.0 - 8.5 g/dL    Albumin 4.8 3.5 - 5.2 g/dL    ALT (SGPT) 36 1 - 41 U/L    AST (SGOT) 31 1 - 40 U/L    Alkaline Phosphatase 71 39 - 117 U/L    Total Bilirubin 0.8 0.0 - 1.2 mg/dL    Globulin 2.3 gm/dL    A/G Ratio 2.1 g/dL    BUN/Creatinine Ratio 16.7 7.0 - 25.0    Anion Gap 9.8 5.0 - 15.0 mmol/L    eGFR 117.0 >60.0 mL/min/1.73   Lipase    Collection Time: 01/08/23 12:37 PM    Specimen: Blood   Result Value Ref Range    Lipase 22 13 - 60 U/L   Troponin    Collection Time: 01/08/23 12:37 PM    Specimen: Blood   Result Value Ref Range    Troponin T <0.010 0.000 - 0.030 ng/mL   CBC Auto Differential    Collection Time: 01/08/23 12:37 PM    Specimen: Blood   Result Value Ref Range    WBC 14.34 (H) 3.40 - 10.80 10*3/mm3    RBC 4.60 4.14 - 5.80 10*6/mm3    Hemoglobin 14.2 13.0 - 17.7 g/dL    Hematocrit 40.9 37.5 - 51.0 %    MCV 88.9 79.0 - 97.0 fL    MCH 30.9 26.6 - 33.0 pg    MCHC 34.7 31.5 - 35.7 g/dL    RDW 12.4 12.3 - 15.4 %    RDW-SD 40.8 37.0 - 54.0 fl    MPV 10.5 6.0 - 12.0 fL    Platelets 195 140 - 450 10*3/mm3    Neutrophil % 83.9 (H) 42.7 - 76.0 %    Lymphocyte % 11.5 (L) 19.6 - 45.3 %    Monocyte % 3.9 (L) 5.0 - 12.0 %    Eosinophil % 0.1 (L) 0.3 - 6.2 %    Basophil % 0.1 0.0 - 1.5 %    Immature Grans % 0.5 0.0 - 0.5 %    Neutrophils, Absolute 12.03 (H) 1.70 - 7.00 10*3/mm3    Lymphocytes, Absolute 1.65 0.70 - 3.10 10*3/mm3    Monocytes, Absolute 0.56 0.10 - 0.90 10*3/mm3    Eosinophils, Absolute 0.01 0.00 - 0.40 10*3/mm3    Basophils, Absolute 0.02 0.00 - 0.20 10*3/mm3    Immature Grans, Absolute 0.07 (H) 0.00 - 0.05 10*3/mm3    nRBC 0.0 0.0 - 0.2 /100 WBC   Urinalysis With Microscopic If Indicated (No Culture) - Urine, Clean Catch    Collection Time: 01/08/23  1:08 PM    Specimen: Urine, Clean Catch   Result Value Ref Range    Color, UA Yellow Yellow, Straw    Appearance, UA Clear Clear    pH, UA 6.5 5.0 - 8.0     Specific Gravity, UA 1.022 1.005 - 1.030    Glucose, UA Negative Negative    Ketones, UA 15 mg/dL (1+) (A) Negative    Bilirubin, UA Negative Negative    Blood, UA Negative Negative    Protein, UA Trace (A) Negative    Leuk Esterase, UA Negative Negative    Nitrite, UA Negative Negative    Urobilinogen, UA 0.2 E.U./dL 0.2 - 1.0 E.U./dL       Ordered the above labs and independently reviewed the results.        RADIOLOGY  CT Abdomen Pelvis Without Contrast    Result Date: 1/8/2023  CT SCAN OF THE ABDOMEN AND PELVIS WITHOUT CONTRAST  HISTORY: Epigastric pain and right lower quadrant pain.  The CT scan was performed as an emergency procedure through the abdomen and pelvis without contrast. The following findings are present: 1. There is a horseshoe anomaly involving the kidneys. There is no evidence of renal stone or obstruction. 2. The lung bases are clear. There is prominent diffuse fatty infiltration of the liver. The spleen, pancreas, and both adrenal glands are unremarkable. The gallbladder shows no gallstones or wall thickening. 3. There is no aortic aneurysm or retroperitoneal lymphadenopathy. The large and small bowel loops are normal in caliber and show no inflammatory change. There appears to be some localized dilatation of the tip of the appendix which contains an appendiceal stone. The appendix in this region measures up to 11 mm and the findings raise the concern of appendicitis. Further clinical correlation is therefore recommended.      Radiation dose reduction techniques were utilized, including automated exposure control and exposure modulation based on body size.  This report was finalized on 1/8/2023 2:37 PM by Dr. Van Falk M.D.      XR Chest 1 View    Result Date: 1/8/2023  XR CHEST 1 VW-  01/08/2023  HISTORY: Chest pain.  Heart size is within normal limits. Lungs are slightly underinflated but appear clear. Bones and soft tissues are unremarkable.      1. No acute process.  This report was  finalized on 1/8/2023 12:35 PM by Dr. Damaso Veloz M.D.        I ordered the above noted radiological studies. Reviewed by me and discussed with radiologist.  See dictation for official radiology interpretation.      PROCEDURES    Procedures      MEDICATIONS GIVEN IN ER    Medications   ondansetron (ZOFRAN) injection 4 mg (4 mg Intravenous Given 1/8/23 1313)   morphine injection 4 mg (4 mg Intravenous Given 1/8/23 1313)   piperacillin-tazobactam (ZOSYN) 3.375 g in iso-osmotic dextrose 50 ml (premix) (3.375 g Intravenous New Bag 1/8/23 1434)         PROGRESS, DATA ANALYSIS, CONSULTS, AND MEDICAL DECISION MAKING    All labs have been independently reviewed by me.  All radiology studies have been reviewed by me and discussed with radiologist dictating the report.   EKG's independently viewed and interpreted by me.  Discussion below represents my analysis of pertinent findings related to patient's condition, differential diagnosis, treatment plan and final disposition.    Differential diagnosis includes but is not limited to appendicitis, cholecystitis, bowel obstruction, IBS, pancreatitis, non-STEMI, STEMI, unstable angina.    ED Course as of 01/08/23 1443   Sun Jan 08, 2023   1253 XR Chest 1 View  My independent interpretation of the chest x-ray is no pneumonia.  Narrow mediastinum. [TR]   1341 CT Abdomen Pelvis Without Contrast  My independent interpretation of the abdominal CT is no pancreatic stranding.  No bowel obstruction [TR]   1410 Discussed with radiologist by phone.  CT shows acute appendicitis. [TR]   1433 I reviewed work-up and findings with the patient and family at the bedside.  Answered all questions.  I have a call out to the general surgeon to discuss.  I have ordered IV antibiotics. [TR]   1442 I discussed with Dr. Barrientos, general surgeon, by phone.  Agrees to take to the operating room. [TR]      ED Course User Index  [TR] Dago Stockton MD           PPE: The patient wore a mask and I wore an  N95 mask throughout the entire patient encounter.       AS OF 14:43 EST VITALS:    BP - 136/86  HR - 70  TEMP - 97.3 °F (36.3 °C) (Tympanic)  O2 SATS - 97%        DIAGNOSIS  Final diagnoses:   Acute abdominal pain in right lower quadrant   Acute appendicitis with localized peritonitis, without perforation, abscess, or gangrene         DISPOSITION  ED Disposition     ED Disposition   Send to OR    Condition   --    Comment   --                Note Disclaimer: At [facility], we believe that sharing information builds trust and better relationships. You are receiving this note because you recently visited [facility]. It is possible you will see health information before a provider has talked with you about it. This kind of information can be easy to misunderstand. To help you fully understand what it means for your health, we urge you to discuss this note with your provider.       Dago Stockton MD  01/08/23 8626

## 2023-01-09 NOTE — ANESTHESIA POSTPROCEDURE EVALUATION
Patient: Berry Villarreal    Procedure Summary     Date: 01/08/23 Room / Location: [facility] OR  / [facility] MAIN OR    Anesthesia Start: 1723 Anesthesia Stop: 1832    Procedure: APPENDECTOMY LAPAROSCOPIC (Abdomen) Diagnosis:     Surgeons: Efren Barrientos MD Provider: Maricruz Marr MD    Anesthesia Type: Not recorded ASA Status: 2 - Emergent          Anesthesia Type: No value filed.    Vitals  Vitals Value Taken Time   /84 01/08/23 1900   Temp 36.9 °C (98.5 °F) 01/08/23 1828   Pulse 71 01/08/23 1900   Resp 23 01/08/23 1835   SpO2 97 % 01/08/23 1900           Post Anesthesia Care and Evaluation    Patient location during evaluation: PACU  Patient participation: complete - patient participated  Level of consciousness: awake  Pain scale: See nurse's notes for pain score.  Pain management: adequate    Airway patency: patent  Anesthetic complications: No anesthetic complications  PONV Status: none  Cardiovascular status: acceptable  Respiratory status: acceptable  Hydration status: acceptable    Comments: Patient seen and examined postoperatively; vital signs stable; SpO2 greater than or equal to 90%; cardiopulmonary status stable; nausea/vomiting adequately controlled; pain adequately controlled; no apparent anesthesia complications; patient discharged from anesthesia care when discharge criteria were met

## 2023-01-10 LAB
LAB AP CASE REPORT: NORMAL
PATH REPORT.FINAL DX SPEC: NORMAL
PATH REPORT.GROSS SPEC: NORMAL

## 2023-01-20 ENCOUNTER — OFFICE VISIT (OUTPATIENT)
Dept: SURGERY | Facility: CLINIC | Age: 43
End: 2023-01-20
Payer: COMMERCIAL

## 2023-01-20 DIAGNOSIS — K35.30 ACUTE APPENDICITIS WITH LOCALIZED PERITONITIS, WITHOUT PERFORATION, ABSCESS, OR GANGRENE: Primary | ICD-10-CM

## 2023-01-20 PROCEDURE — 99024 POSTOP FOLLOW-UP VISIT: CPT

## 2023-01-23 NOTE — PROGRESS NOTES
CC: Postop    History of presenting illness:   This is a 42-year-old male here for a postoperative appointment.  He recently underwent a laparoscopic appendectomy.  He reports he is recovering well since surgery.  Denies any abdominal pain.  Denies any nausea, vomiting, fever, or chills.  He reports normal bowel movements.  Denies any fatigue, reports normal appetite.    Surgical History: Laparoscopic Appendectomy 1/8/2023 Dr. Barrientos    Pathology:   Final Diagnosis   1. Appendix, Appendectomy:               A. Acute appendicitis, adam-appendicitis and serositis.               B. Negative for neoplasm.     Review of Systems:  Constitutional: negative for fever chills  Respiratory: negative for SOB, cough, hemoptysis or wheezing  Cardiovascular: negative for chest pain, palpitations or peripheral edema  Gastrointestinal: negative for abdominal pain, nausea, vomiting, diarrhea, constipation    Physical Exam:  BMI: 31.60  General: alert and oriented, appropriate, no acute distress  Respiratory: There is good bilateral chest expansion, no use of accessory muscles is noted  Cardiovascular: No jugular venous distention or peripheral edema is seen  Gastrointestinal: Soft, nontender, incisions are healing nicely, no erythema, warmth, or drainage noted, residual surgical glue present    Assessment and plan:   42-year-old male status post laparoscopic appendectomy (POD #12).  He is recovering well since surgery.  His incisions are healing nicely.  Pathology reviewed.  Activity restrictions discussed, he should continue to refrain from heavy lifting for 4 weeks from surgery date.  He can follow-up in the office as needed.    Blank Sanchez PA-C  General Surgery    [facility]  [address], [address]  [place], [place], [address]  P: [phone]  F: [phone]

## 2024-01-30 RX ORDER — AMOXICILLIN 500 MG/1
500 CAPSULE ORAL 2 TIMES DAILY
Qty: 20 CAPSULE | Refills: 0 | Status: SHIPPED | OUTPATIENT
Start: 2024-01-30 | End: 2024-02-09

## 2024-10-04 ENCOUNTER — OFFICE VISIT (OUTPATIENT)
Dept: FAMILY MEDICINE CLINIC | Facility: CLINIC | Age: 44
End: 2024-10-04
Payer: COMMERCIAL

## 2024-10-04 VITALS
HEART RATE: 66 BPM | WEIGHT: 184 LBS | TEMPERATURE: 97.7 F | BODY MASS INDEX: 30.62 KG/M2 | DIASTOLIC BLOOD PRESSURE: 62 MMHG | OXYGEN SATURATION: 98 % | SYSTOLIC BLOOD PRESSURE: 124 MMHG

## 2024-10-04 DIAGNOSIS — S99.912A INJURY OF LEFT ANKLE, INITIAL ENCOUNTER: ICD-10-CM

## 2024-10-04 DIAGNOSIS — Z23 NEED FOR TDAP VACCINATION: Primary | ICD-10-CM

## 2024-10-04 NOTE — PROGRESS NOTES
Chief Complaint    Ankle Pain (Twisted LT foot 09/30 and felt a pop on lateral side of ankle/foot.  Continues to have pain/swelling. )    Subjective      Berry Villarreal presents to [facility]    1.) ANKLE INJURY : Occurred on 9/30. Patient reports appreciating a popping sensation of the posterior lateral aspect of his foot and ankle. He is able to bear weight but presents with edema of the lateral aspect of his ankle joint. No significant previous injury of said ankle.     Objective     Vital Signs:     /62 (BP Location: Left arm, Patient Position: Sitting, Cuff Size: Adult)   Pulse 66   Temp 97.7 °F (36.5 °C) (Temporal)   Wt 83.5 kg (184 lb)   SpO2 98%   BMI 30.62 kg/m²       Physical Exam  Vitals reviewed.   Constitutional:       General: He is not in acute distress.     Appearance: Normal appearance. He is well-developed.   HENT:      Head: Normocephalic and atraumatic.      Right Ear: Hearing and external ear normal.      Left Ear: Hearing and external ear normal.      Nose: Nose normal.   Eyes:      General: Lids are normal.         Right eye: No discharge.         Left eye: No discharge.      Conjunctiva/sclera: Conjunctivae normal.   Pulmonary:      Effort: Pulmonary effort is normal.   Abdominal:      General: There is no distension.   Musculoskeletal:         General: No swelling.      Cervical back: Neck supple.        Legs:       Comments: Effusion appreciated of lateral malleolus. No significant tenderness noted with palpation. No overt instability noted of ankle joint with passive testing.    Skin:     Coloration: Skin is not jaundiced.      Findings: No erythema.   Neurological:      Mental Status: He is alert. Mental status is at baseline.   Psychiatric:         Mood and Affect: Mood and affect normal.         Thought Content: Thought content normal.     Assessment and Plan     Diagnoses and all orders for this visit:    1. Need for Tdap vaccination  (Primary)  -     Tdap Vaccine => 8yo IM (BOOSTRIX/ADACEL)    2. Injury of left ankle, initial encounter  Comments:  Will await official rad report. Continue with relative rest, ice, activity as tolerated.  Orders:  -     XR Ankle 3+ View Left    Follow Up     Return TBD per review of imaging report.    Patient was given instructions and counseling regarding his condition or for health maintenance advice. Please see specific information pulled into the AVS if appropriate.

## 2024-10-14 ENCOUNTER — TELEPHONE (OUTPATIENT)
Dept: FAMILY MEDICINE CLINIC | Facility: CLINIC | Age: 44
End: 2024-10-14
Payer: COMMERCIAL

## 2024-10-14 NOTE — TELEPHONE ENCOUNTER
Was seen in office on 10/04 for LT ankle injury (just to jar your memory, he is my brother in law).  X-Ray was negative for fracture.  He is still having pain and a lot of instability in the ankle.  Could you possibly order a CT LT ankle for further evaluation (is unable to do MRI due to metal shards in body)?

## 2024-10-15 DIAGNOSIS — S99.912S INJURY OF LEFT ANKLE, SEQUELA: Primary | ICD-10-CM

## 2024-11-06 ENCOUNTER — HOSPITAL ENCOUNTER (OUTPATIENT)
Dept: CT IMAGING | Facility: HOSPITAL | Age: 44
Discharge: HOME | End: 2024-11-06
Admitting: FAMILY MEDICINE
Payer: COMMERCIAL

## 2024-11-06 DIAGNOSIS — S99.912S INJURY OF LEFT ANKLE, SEQUELA: ICD-10-CM

## 2024-11-06 PROCEDURE — 73700 CT LOWER EXTREMITY W/O DYE: CPT

## 2024-11-08 DIAGNOSIS — S99.912S INJURY OF LEFT ANKLE, SEQUELA: Primary | ICD-10-CM

## 2024-11-25 ENCOUNTER — OFFICE VISIT (OUTPATIENT)
Dept: ORTHOPEDIC SURGERY | Facility: CLINIC | Age: 44
End: 2024-11-25
Payer: COMMERCIAL

## 2024-11-25 VITALS — HEIGHT: 65 IN | WEIGHT: 186.4 LBS | BODY MASS INDEX: 31.06 KG/M2 | TEMPERATURE: 97.7 F

## 2024-11-25 DIAGNOSIS — M25.372 INSTABILITY OF LEFT ANKLE JOINT: ICD-10-CM

## 2024-11-25 DIAGNOSIS — S86.302A INJURY OF PERONEAL TENDON OF LEFT FOOT, INITIAL ENCOUNTER: Primary | ICD-10-CM

## 2024-11-25 DIAGNOSIS — M19.072 ARTHRITIS OF LEFT ANKLE: ICD-10-CM

## 2024-11-25 DIAGNOSIS — S93.402A SPRAIN OF LEFT ANKLE, UNSPECIFIED LIGAMENT, INITIAL ENCOUNTER: ICD-10-CM

## 2024-11-25 DIAGNOSIS — R52 PAIN: ICD-10-CM

## 2024-11-25 DIAGNOSIS — H44.603 RETAINED MAGNETIC INTRAOCULAR FOREIGN BODY OF BOTH EYES: Primary | ICD-10-CM

## 2024-11-25 DIAGNOSIS — M92.62 HAGLUND'S DEFORMITY OF LEFT HEEL: ICD-10-CM

## 2024-11-25 DIAGNOSIS — M65.28 CALCIFIC ACHILLES TENDONITIS: ICD-10-CM

## 2024-11-25 DIAGNOSIS — Z18.11 RETAINED MAGNETIC INTRAOCULAR FOREIGN BODY OF BOTH EYES: Primary | ICD-10-CM

## 2024-11-25 NOTE — PROGRESS NOTES
New Patient Complaint      Patient: Berry Villarreal  YOB: 1980 44 y.o. 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  Felt a pop in the inferolateral aspect of his left hindfoot but not sure if he rolled it.  He had not had any prior injury to the ankle.  He saw his PCP on 10/11/2024 for this and was subsequently sent for CT scan it was unclear whether he could have an MRI.    He is seen today with persistent burning pain in the posterolateral aspect the left ankle especially pointing his toes and had some feelings of instability and intermittent popping in the ankle.    He works as a mechanical contractor and reported to me that his sister aMira is a previous patient of mine.    Patient is seen today at the request of Carla Diamond DO who has requested my opinion regarding etiology and treatment of this condition            HPI    Allergies:   Allergies   Allergen Reactions    Iodine Hives       Medications:   No current outpatient medications on file prior to visit.     No current facility-administered medications on file prior to visit.       History reviewed. No pertinent past medical history.  Past Surgical History:   Procedure Laterality Date    APPENDECTOMY N/A 1/8/2023    Procedure: APPENDECTOMY LAPAROSCOPIC;  Surgeon: Efren Barrientos MD;  Location: [facility];  Service: General;  Laterality: N/A;    HERNIA REPAIR       Social History     Occupational History    Not on file   Tobacco Use    Smoking status: Never    Smokeless tobacco: Never   Vaping Use    Vaping status: Never Used   Substance and Sexual Activity    Alcohol use: Not Currently    Drug use: Never    Sexual activity: Yes     Partners: Female      Social History     Social History Narrative    Not on file     Family History  "  Problem Relation Age of Onset    COPD Mother     No Known Problems Father        Review of Systems: 14 point review of systems performed, positive pertinent findings identified in HPI. All remaining systems negative     Review of Systems      Physical Exam:   Vitals:    11/25/24 0826   Temp: 97.7 °F (36.5 °C)   Weight: 84.6 kg (186 lb 6.4 oz)   Height: 165.1 cm (65\")   PainSc:   2     Physical Exam   Constitutional: pleasant, well developed   Eyes: sclera non icteric  Hearing : adequate for exam  Cardiovascular: palpable pulses in left foot, left calf/ thigh NT without sign of DVT  Respiratoy: breathing unlabored   Neurological: grossly sensate to LT throughout left LE  Psychiatric: oriented with normal mood and affect.   Lymphatic: No palpable popliteal lymphadenopathy left LE  Skin: intact throughout left leg/foot  Musculoskeletal: On exam he has mild swelling the left ankle with mild tenderness over the anterior ligamentous structures with questionable anterior drawer with some guarding.  He is mildly tender on the peroneal tendons but did not have gross exacerbation of pain with resisted eversion no subluxation.  He was nontender over medial ankle  Physical Exam  Ortho Exam    Radiology: 3 views of the left ankle ordered evaluate pain and alignment reviewed and compared to x-rays in the [facility] from 10/4/2024.  Today's x-rays do not show any obvious fracture.  There is some swelling of the soft tissue laterally.  There are some slight chronic ossification at the tip of the medial malleolus.  There is a prominent superior calcaneal tuberosity and posterior calcaneal spurring.  There is some chronic ossification of the posterior distal tibia.    CT scan films and report of the left ankle dated 11/6/2024 reviewed which described mild midfoot arthritis.  There are some soft tissue swelling along lateral ankle.  Tendon or ligament evaluation is limited but there is possible injury to the ATFL and " CFL.    Assessment/Plan:.  Left ankle anterolateral ligamentous injury with possible peroneal tendon tear  2.  Left midfoot arthritis    We discussed treatment going forward ahead and fitted with an ASO brace.  Will limit activity on this and prescribed compounding cream to apply anterolaterally and posterolaterally.    He was unsure whether he could have an MRI.  He said he has been working with metal shavings a lot and had 1 previously and that it was removed.  He had been told that he could potentially have scans done.    Best we can get an MRI so going to send him for evaluation for MRI of the left ankle as this will give us much more information and possible regarding his pathology.    Will see him back in about 3 weeks to review MRI and assess progress and determine treatment course going forward.

## 2024-11-26 ENCOUNTER — PATIENT ROUNDING (BHMG ONLY) (OUTPATIENT)
Dept: ORTHOPEDIC SURGERY | Facility: CLINIC | Age: 44
End: 2024-11-26
Payer: COMMERCIAL

## 2024-11-26 NOTE — PROGRESS NOTES
A [employer] Message has been sent to the patient for PATIENT ROUNDING with [facility]

## 2024-12-09 ENCOUNTER — TELEPHONE (OUTPATIENT)
Dept: ORTHOPEDIC SURGERY | Facility: CLINIC | Age: 44
End: 2024-12-09
Payer: COMMERCIAL

## 2024-12-09 NOTE — TELEPHONE ENCOUNTER
Caller: JAYE BETANCOURT    Relationship: Emergency Contact    Best call back number: [phone]    Requested Prescriptions:  TRANSDERMAL CREAM FOR ANKLE    Pharmacy where request should be sent: [facility] - [facility] [address] - [phone]  - [phone] FX     Last office visit with prescribing clinician: 11/25/2024   Last telemedicine visit with prescribing clinician: Visit date not found   Next office visit with prescribing clinician: 12/30/2024     Additional details provided by patient: PT'S WIFE STATES [facility] NEVER RECEIVED THIS RX. IT WAS SUPPOSED TO BE SENT TO THE [facility]IL ORDER [facility].     Does the patient have less than a 3 day supply:  [x] Yes  [] No    Would you like a call back once the refill request has been completed: [x] Yes [] No    If the office needs to give you a call back, can they leave a voicemail: [x] Yes [] No    Rajan Reyes   12/09/24 11:34 EST

## 2024-12-09 NOTE — TELEPHONE ENCOUNTER
I returned call to patient's wife and apologize for not having sent through the prescription for Rx alternatives.  Will get that taken care of today.  She appreciated the call and reported that she understood.  He is having his MRI on 12/18/2024 and discussed following up on 12/30/2024.

## 2024-12-11 ENCOUNTER — HOSPITAL ENCOUNTER (OUTPATIENT)
Dept: GENERAL RADIOLOGY | Facility: HOSPITAL | Age: 44
Discharge: HOME | End: 2024-12-11
Admitting: FAMILY MEDICINE
Payer: COMMERCIAL

## 2024-12-11 DIAGNOSIS — Z18.11 RETAINED MAGNETIC INTRAOCULAR FOREIGN BODY OF BOTH EYES: ICD-10-CM

## 2024-12-11 DIAGNOSIS — H44.603 RETAINED MAGNETIC INTRAOCULAR FOREIGN BODY OF BOTH EYES: ICD-10-CM

## 2024-12-11 PROCEDURE — 70200 X-RAY EXAM OF EYE SOCKETS: CPT

## 2024-12-16 ENCOUNTER — OFFICE VISIT (OUTPATIENT)
Dept: ORTHOPEDIC SURGERY | Facility: CLINIC | Age: 44
End: 2024-12-16
Payer: COMMERCIAL

## 2024-12-16 VITALS — TEMPERATURE: 96.4 F | WEIGHT: 194.6 LBS | BODY MASS INDEX: 33.22 KG/M2 | HEIGHT: 64 IN

## 2024-12-16 DIAGNOSIS — R52 PAIN: Primary | ICD-10-CM

## 2024-12-16 DIAGNOSIS — S90.32XA CONTUSION OF LEFT FOOT, INITIAL ENCOUNTER: ICD-10-CM

## 2024-12-16 NOTE — PROGRESS NOTES
Foot/Ankle Follow Up      Patient: Berry Villarreal    YOB: 1980 44 y.o. 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  He felt a pop in the inferolateral aspect of his left hindfoot but was not sure if he rolled it.  He reported no prior injury to the ankle.  He had seen his PCP on 10/11/2024 for this and was subsequently sent for CT scan it was unclear whether he could have an MRI.     When seen on 11/25/2024 he reported persistent burning pain in the posterolateral aspect the left ankle especially pointing his toes and had some feelings of instability and intermittent popping in the ankle.     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    I was worried about anterolateral ligamentous injury and/or peroneal tendon tear.  He was fitted with an ASO brace and instructed on limiting activities and prescribed compounding cream.  He was unsure whether he could have an MRI with metal shavings that he work with a lot but he had been told he could potentially have some scans done to evaluate for that.  He was sent for MRI for further evaluation of his ankle    Patient subsequently notified me that he had injured his left foot by dropping some wood on it.  He is seen today reporting that on 12/7/2024 he dropped a piece of plywood across his foot and across the first and second toes and has had some intermittent numbness tingling and swelling in those toes with twitching.  His MRI for the ankle is not scheduled until 12/18/2024.  However he is not really been using his brace all that much as it is been hard to use causing some swelling in his foot has been using hightop boots with wide accommodative shoes and tolerating this.  HPI    ROS: ankle  "pain  History reviewed. No pertinent past medical history.    Physical Exam:   Vitals:    12/16/24 1532   Temp: 96.4 °F (35.8 °C)   Weight: 88.3 kg (194 lb 9.6 oz)   Height: 162.6 cm (64\")   PainSc:   3   PainLoc: Foot     Well developed with normal mood.  Left foot shows some mild swelling on the left first and second toes with slight tenderness to palpation he was able to flex and extend his toes relatively well and toes were grossly sensate to light touch.      Radiology: 3 views left foot ordered evaluate pain reviewed and compared to previous x-rays.  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  No other obvious malalignment or fracture there is arthritis of the first MTP joint.  No clear fracture of the toes midfoot forefoot or hindfoot.      CT scan films and report of the left ankle dated 11/6/2024 reviewed which described mild midfoot arthritis.  There are some soft tissue swelling along lateral ankle.  Tendon or ligament evaluation is limited but there is possible injury to the ATFL and CFL.     Assessment/Plan:.  Left ankle anterolateral ligamentous injury with possible peroneal tendon tear  2.  Left midfoot arthritis  3.  Left first and second toe contusions without clear evidence of fracture    We discussed treatment going forward we will have him continue with his ASO brace if tolerated or at least hightop boots for his ankle and he is having his MRI done on 12/18/2024    Reviewed with him I do not see any clear fracture of his toes.  May have some swelling and nerve compression causing the tingling and swelling and he was there was a fracture it is likely occult and would not recommend any further workup or treatment thereof    He may use compounding cream on these that he was already prescribed for his ankle and we will see him back on 12/30/2024 to assess progress review MRI and determine treatment course going forward.    X-ray left foot if having any worsening " pain in the toes.

## 2024-12-18 ENCOUNTER — HOSPITAL ENCOUNTER (OUTPATIENT)
Dept: MRI IMAGING | Facility: HOSPITAL | Age: 44
Discharge: HOME | End: 2024-12-18
Admitting: ORTHOPAEDIC SURGERY
Payer: COMMERCIAL

## 2024-12-18 DIAGNOSIS — S86.302A INJURY OF PERONEAL TENDON OF LEFT FOOT, INITIAL ENCOUNTER: ICD-10-CM

## 2024-12-18 DIAGNOSIS — S93.402A SPRAIN OF LEFT ANKLE, UNSPECIFIED LIGAMENT, INITIAL ENCOUNTER: ICD-10-CM

## 2024-12-18 DIAGNOSIS — M25.372 INSTABILITY OF LEFT ANKLE JOINT: ICD-10-CM

## 2024-12-18 PROCEDURE — 73721 MRI JNT OF LWR EXTRE W/O DYE: CPT

## 2024-12-20 NOTE — PROGRESS NOTES
Foot/Ankle Follow Up      Patient: Berry Villarreal    YOB: 1980 44 y.o. 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  He felt a pop in the inferolateral aspect of his left hindfoot but was not sure if he rolled it.  He reported no prior injury to the ankle.  He had seen his PCP on 10/11/2024 for this and was subsequently sent for CT scan it was unclear whether he could have an MRI.     When seen on 11/25/2024 he reported persistent burning pain in the posterolateral aspect the left ankle especially pointing his toes and had some feelings of instability and intermittent popping in the ankle.     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     I was worried about anterolateral ligamentous injury and/or peroneal tendon tear.  He was fitted with an ASO brace and instructed on limiting activities and prescribed compounding cream.  He was unsure whether he could have an MRI with metal shavings that he work with a lot but he had been told he could potentially have some scans done to evaluate for that.  He was sent for MRI for further evaluation of his ankle     Patient subsequently notified me that he had injured his left foot by dropping some wood on it.  He was seen on 12/16/2024 reporting that on 12/7/2024 he dropped a piece of plywood across his foot and across the first and second toes and had had some intermittent numbness tingling and swelling in those toes with twitching.  His MRI for the ankle was not scheduled until 12/18/2024.  However he had not really been using his brace all that much as it had been hard to use causing some swelling in his foot had been using hightop boots with wide accommodative shoes and  "tolerating this.    We discussed treatment at that time did not see any clear fracture of the toes.  Reviewed he may have some swelling and nerve compression causing tingling and swelling if there was a fracture was likely occult and would not recommend any further workup or treatment thereof.  He was instructed using the compounding cream that he had for his ankle on his toes and to continue with ASO brace if tolerated or at least hightop boots for his ankle.    Patient is seen back today stating his ankle is about the same and does not feel as strong as it was.  Does get some pain posterior and posterior laterally with plantarflexion and some pain on the Achilles with dorsiflexion.  He has been intermittently using his brace or hightop boots.  He reports that his toes are feeling better still little bit of discomfort but no longer having the twitching.  He is only been intermittently using compounding cream.      HPI    ROS: ankle pain  No past medical history on file.    Physical Exam:   Vitals:    12/30/24 0835   Temp: 98 °F (36.7 °C)   Weight: 88 kg (194 lb)   Height: 162.6 cm (64.02\")   PainSc:   2     Well developed with normal mood.  On exam he has slight tenderness palpation of the peroneal tendons with resisted eversion which is 5 out of 5 without subluxation minimal tenderness along the Achilles without palpable defects grossly stable anterolateral ligamentous exam.  There is some palpable arthritic change on left first MTP joint with minimal discomfort.      Radiology: MRI films and report of the left ankle dated 12/18/2024 reviewed which showed no fracture or malalignment.  Marrow signal was normal.  There was thickening of the peroneus longus tendon with intermediate signal consistent with tendinosis.  Other visualized tendons around the ankle appear unremarkable as to the ankle ligaments which appeared intact.  There is moderate tibiotalar effusion and small subtalar joint effusion.  Cartilage of the " joint was intact.  There is mild arthritic change at the midfoot and a small plantar calcaneal spur with intact plantar fascia.      CT scan films and report of the left ankle dated 11/6/2024 reviewed which described mild midfoot arthritis.  There are some soft tissue swelling along lateral ankle.  Tendon or ligament evaluation is limited but there is possible injury to the ATFL and CFL.     Assessment/Plan:.  1.  Left ankle anterolateral ligamentous clinical sprain with peroneus longus tendinosis without tear and moderate tibiotalar and small subtalar joint effusion  2.  Left midfoot arthritis  3.  Left first and second toe contusions without clear evidence of fracture  4.  Left first MTP arthritis    We discussed treatment going forward and I do not see anything I would do from a surgical standpoint    We discussed treatment going forward he will continue with use of his ASO brace and/or hightop boot and was fitted with a lateral heel wedge to use when he is not using the brace.    He will use the compounding cream more regularly as prescribed and we will get him into some physical therapy to work on stretching and stabilization etc.    As far as the toes are concerned nothing further to do and may use the compounding cream around this area if needed.    We  a very pleasant visit and we will see him back in 6 weeks no x-rays unless he is having increased pain.

## 2024-12-30 ENCOUNTER — OFFICE VISIT (OUTPATIENT)
Dept: ORTHOPEDIC SURGERY | Facility: CLINIC | Age: 44
End: 2024-12-30
Payer: COMMERCIAL

## 2024-12-30 VITALS — WEIGHT: 194 LBS | BODY MASS INDEX: 33.12 KG/M2 | TEMPERATURE: 98 F | HEIGHT: 64 IN

## 2024-12-30 DIAGNOSIS — S93.402A SPRAIN OF LEFT ANKLE, UNSPECIFIED LIGAMENT, INITIAL ENCOUNTER: ICD-10-CM

## 2024-12-30 DIAGNOSIS — M92.62 HAGLUND'S DEFORMITY OF LEFT HEEL: ICD-10-CM

## 2024-12-30 DIAGNOSIS — M19.072 ARTHRITIS OF LEFT FOOT: ICD-10-CM

## 2024-12-30 DIAGNOSIS — S90.32XD CONTUSION OF LEFT FOOT, SUBSEQUENT ENCOUNTER: ICD-10-CM

## 2024-12-30 DIAGNOSIS — M76.72 PERONEAL TENDONITIS, LEFT: Primary | ICD-10-CM

## 2024-12-30 DIAGNOSIS — M19.072 ARTHRITIS OF FIRST METATARSOPHALANGEAL (MTP) JOINT OF LEFT FOOT: ICD-10-CM

## 2024-12-30 DIAGNOSIS — M65.28 CALCIFIC ACHILLES TENDONITIS: ICD-10-CM

## (undated) DEVICE — TROCAR SITE CLOSURE DEVICE: Brand: ENDO CLOSE

## (undated) DEVICE — ECHELON FLEX45 ENDOPATH STAPLER, ARTICULATING ENDOSCOPIC LINEAR CUTTER (NO CARTRIDGE): Brand: ECHELON ENDOPATH

## (undated) DEVICE — ENDOPATH XCEL UNIVERSAL TROCAR STABLILITY SLEEVES: Brand: ENDOPATH XCEL

## (undated) DEVICE — STPLR SKIN VISISTAT WD 35CT

## (undated) DEVICE — LAPAROSCOPIC SMOKE FILTRATION SYSTEM: Brand: PALL LAPAROSHIELD® PLUS LAPAROSCOPIC SMOKE FILTRATION SYSTEM

## (undated) DEVICE — ENDOCUT SCISSOR TIP, DISPOSABLE: Brand: RENEW

## (undated) DEVICE — DISPOSABLE MONOPOLAR ENDOSCOPIC CORD 10 FT. (3M): Brand: KIRWAN

## (undated) DEVICE — GLV SURG BIOGEL LTX PF 7 1/2

## (undated) DEVICE — ENDOPATH XCEL BLADELESS TROCARS WITH STABILITY SLEEVES: Brand: ENDOPATH XCEL

## (undated) DEVICE — SUT VIC 0/0 UR6 27IN DYED J603H

## (undated) DEVICE — 2, DISPOSABLE SUCTION/IRRIGATOR WITH DISPOSABLE TIP: Brand: STRYKEFLOW

## (undated) DEVICE — APPL CHLORAPREP HI/LITE 26ML ORNG

## (undated) DEVICE — ENDOPOUCH RETRIEVER SPECIMEN RETRIEVAL BAGS: Brand: ENDOPOUCH RETRIEVER

## (undated) DEVICE — LOU LAP CHOLE: Brand: MEDLINE INDUSTRIES, INC.

## (undated) DEVICE — TOTAL TRAY, 16FR 10ML SIL FOLEY, URN: Brand: MEDLINE

## (undated) DEVICE — SUT MNCRYL PLS ANTIB UD 4/0 PS2 18IN

## (undated) DEVICE — HARMONIC ACE +7 LAPAROSCOPIC SHEARS ADVANCED HEMOSTASIS 5MM DIAMETER 36CM SHAFT LENGTH  FOR USE WITH GRAY HAND PIECE ONLY: Brand: HARMONIC ACE

## (undated) DEVICE — KT CLN CLEANOR SCPE